# Patient Record
(demographics unavailable — no encounter records)

---

## 2025-04-01 NOTE — HISTORY OF PRESENT ILLNESS
[FreeTextEntry1] : 56-year-old F pt, with Hx of hypothyroidism (dx more than 10 years ago) and obesity (began ~21 years ago), self-referred, presents today to establish endocrine care. Other PMHx: HTH, arthritis, sciatica, COVID (2022) PSHx: hernia surgery, , removal of gangling cyst on L wrist FHx: Mother: HTN  SHx: nonsmoker. EtOH use usually. Working and lives with family.  Steroid Use: Y for rash on neck  No radiation or surgical ablation of neck. No hx of antidepressant medications.  No recent hospitalizations.  NKDA   10/11/2023 Pt is here for hypothyroidism (dx more than 10 years ago) and obesity management (noted ~20 years ago) after a routine visit to her PCP who referred her to an endocrinologist.   Pt has /85 and BMI 33.84.  CC: "I am feeling better today".  At her last PCP apt, she states she informed her PCP that she is "too fat" and wants to lose weight. Subsequently, she reports her PCP told her to see an endocrinologist. She states she was able to lose weight in the past by fasting but has been unable to keep the weight off. Moreover, she states she was prescribed a "2 pill concoction" in  by her PCP for weight loss which worked but she discontinued this medication after a few months for unknown reasons. She reports has fluctuated between 160-180 lbs over the past 20 years after the birth of her youngest child. She attributes this fluctuation to "not taking her thyroid medication correctly".  In regard to her hypothyroidism, she endorses no complaints.  Pt endorses pain in her back and knees due to arthritis and weight gain.   2023  Pt has /75 and BMI 33.11. No significant weight change.  Pt presents today for endocrine care and is clinically unchanged. Pt is not taking her metformin as originally prescribed because she feels "not right" when she takes twice a day. As a result, she takes it irregularly once a day. Pt has visited a RD and is attempting to improve her diet and modify her lifestyle. Pt denies palpitations and neck discomfort.  Review In Labs: - 23: TSH 3.5, Free T4 0.9  2025 Pt has /78 and BMI 29.6. No significant weight change. CC: "I am doing fabulous, even though I am sick today".  Pt w/ hx of obesity was consistently taking Zepbound since 2024. While taking Zepbound pt denies nausea, vomiting, diarrhea and endorses momentary dizziness. Starting Saturday, she endorses having had experienced diarrhea and states that she feels as though she has food poisoning. Pt states that she last used Zepbound on 2025 and has not used it since due to a medication shortage at her pharmacy.    [Medications verified as per pt on 2025] Current Medications: Mounjaro 2.5 mg qw (rx 2023), Levothyroxine 50 mcg qd, Rosuvastatin 10 mg qd, Amlodipine 5 mg qam, HCTZ 12.5 mg qd (does not take every day but takes regularly), Gabapentin 300 mg prn, Naproxen 500 mg bid

## 2025-04-01 NOTE — REVIEW OF SYSTEMS
[Negative] : Heme/Lymph [Dizziness] : dizziness [Nausea] : no nausea [Vomiting] : no vomiting [Diarrhea] : no diarrhea [de-identified] : brief dizziness.

## 2025-04-01 NOTE — ADDENDUM
[FreeTextEntry1] :  I, Sisi Naidu, act solely as a scribe for Dr. Theron Norris on this date. 04/01/2025

## 2025-04-01 NOTE — PHYSICAL EXAM
[No Acute Distress] : no acute distress [Normal Sclera/Conjunctiva] : normal sclera/conjunctiva [No Proptosis] : no proptosis [Normal Outer Ear/Nose] : the ears and nose were normal in appearance [Thyroid Not Enlarged] : the thyroid was not enlarged [No Thyroid Nodules] : no palpable thyroid nodules [Clear to Auscultation] : lungs were clear to auscultation bilaterally [Normal Rate] : heart rate was normal [No Edema] : no peripheral edema [Soft] : abdomen soft [Spine Straight] : spine straight [No Stigmata of Cushings Syndrome] : no stigmata of Cushings Syndrome [Normal Gait] : normal gait [Normal Strength/Tone] : muscle strength and tone were normal [No Rash] : no rash [Normal Reflexes] : deep tendon reflexes were 2+ and symmetric [No Tremors] : no tremors [Oriented x3] : oriented to person, place, and time [de-identified] : no buffalo hump. no supraclavicular fat pad.  [de-identified] : mild acanthosis.

## 2025-04-01 NOTE — END OF VISIT
[FreeTextEntry3] :  All medical record entries made by the Scribe were at my, Dr. Theron Norris, direction and personally dictated by me on 04/01/2025. I have reviewed the chart and agree that the record accurately reflects my personal performance of the history, physical exam, and assessment and plan. I have also personally directed, reviewed, and agreed with the chart. [Time Spent: ___ minutes] : I have spent [unfilled] minutes of time on the encounter which excludes teaching and separately reported services.

## 2025-04-01 NOTE — PHYSICAL EXAM
[No Acute Distress] : no acute distress [Normal Sclera/Conjunctiva] : normal sclera/conjunctiva [No Proptosis] : no proptosis [Normal Outer Ear/Nose] : the ears and nose were normal in appearance [Thyroid Not Enlarged] : the thyroid was not enlarged [No Thyroid Nodules] : no palpable thyroid nodules [Clear to Auscultation] : lungs were clear to auscultation bilaterally [Normal Rate] : heart rate was normal [No Edema] : no peripheral edema [Soft] : abdomen soft [Spine Straight] : spine straight [No Stigmata of Cushings Syndrome] : no stigmata of Cushings Syndrome [Normal Gait] : normal gait [Normal Strength/Tone] : muscle strength and tone were normal [No Rash] : no rash [Normal Reflexes] : deep tendon reflexes were 2+ and symmetric [No Tremors] : no tremors [Oriented x3] : oriented to person, place, and time [de-identified] : no buffalo hump. no supraclavicular fat pad.  [de-identified] : mild acanthosis.

## 2025-04-01 NOTE — REVIEW OF SYSTEMS
[Negative] : Heme/Lymph [Dizziness] : dizziness [Nausea] : no nausea [Vomiting] : no vomiting [Diarrhea] : no diarrhea [de-identified] : brief dizziness.

## 2025-07-12 NOTE — PHYSICAL EXAM
[No Acute Distress] : no acute distress [Normal Sclera/Conjunctiva] : normal sclera/conjunctiva [No Proptosis] : no proptosis [Normal Outer Ear/Nose] : the ears and nose were normal in appearance [Thyroid Not Enlarged] : the thyroid was not enlarged [No Thyroid Nodules] : no palpable thyroid nodules [Clear to Auscultation] : lungs were clear to auscultation bilaterally [Normal Rate] : heart rate was normal [No Edema] : no peripheral edema [Soft] : abdomen soft [Spine Straight] : spine straight [No Stigmata of Cushings Syndrome] : no stigmata of Cushings Syndrome [Normal Gait] : normal gait [Normal Strength/Tone] : muscle strength and tone were normal [No Rash] : no rash [Normal Reflexes] : deep tendon reflexes were 2+ and symmetric [No Tremors] : no tremors [Oriented x3] : oriented to person, place, and time [de-identified] : thyroid normal [de-identified] : no buffalo hump. no supraclavicular fat pad.  [de-identified] : mild acanthosis.

## 2025-07-12 NOTE — END OF VISIT
[FreeTextEntry3] :  All medical record entries made by the Scribe were at my, Dr. Theron Norris, direction and personally dictated by me on 07/10/2025. I have reviewed the chart and agree that the record accurately reflects my personal performance of the history, physical exam, and assessment and plan. I have also personally directed, reviewed, and agreed with the chart.  [Time Spent: ___ minutes] : I have spent [unfilled] minutes of time on the encounter which excludes teaching and separately reported services.

## 2025-07-12 NOTE — HISTORY OF PRESENT ILLNESS
[FreeTextEntry1] : 58-year-old F pt, with Hx of hypothyroidism (dx more than 10 years ago) and obesity (began ~21 years ago), self-referred, presents today to establish endocrine care. Other PMHx: HTH, arthritis, sciatica, COVID (2022) PSHx: hernia surgery, , removal of gangling cyst on L wrist FHx: Mother: HTN  SHx: nonsmoker. EtOH use usually. Working and lives with family.  Steroid Use: Y for rash on neck  No radiation or surgical ablation of neck. No hx of antidepressant medications.  No recent hospitalizations.  NKDA   10/11/2023 Pt is here for hypothyroidism (dx more than 10 years ago) and obesity management (noted ~20 years ago) after a routine visit to her PCP who referred her to an endocrinologist.   Pt has /85 and BMI 33.84.  CC: "I am feeling better today".  At her last PCP apt, she states she informed her PCP that she is "too fat" and wants to lose weight. Subsequently, she reports her PCP told her to see an endocrinologist. She states she was able to lose weight in the past by fasting but has been unable to keep the weight off. Moreover, she states she was prescribed a "2 pill concoction" in  by her PCP for weight loss which worked but she discontinued this medication after a few months for unknown reasons. She reports has fluctuated between 160-180 lbs over the past 20 years after the birth of her youngest child. She attributes this fluctuation to "not taking her thyroid medication correctly".  In regard to her hypothyroidism, she endorses no complaints.  Pt endorses pain in her back and knees due to arthritis and weight gain.   2023  Pt has /75 and BMI 33.11. No significant weight change.  Pt presents today for endocrine care and is clinically unchanged. Pt is not taking her metformin as originally prescribed because she feels "not right" when she takes twice a day. As a result, she takes it irregularly once a day. Pt has visited a RD and is attempting to improve her diet and modify her lifestyle. Pt denies palpitations and neck discomfort.  Review In Labs: - 23: TSH 3.5, Free T4 0.9  2025 Pt has /78 and BMI 29.6. No significant weight change. CC: "I am doing fabulous, even though I am sick today".  Pt w/ hx of obesity was consistently taking Zepbound since 2024. While taking Zepbound pt denies nausea, vomiting, diarrhea and endorses momentary dizziness. Starting Saturday, she endorses having had experienced diarrhea and states that she feels as though she has food poisoning. Pt states that she last used Zepbound on 2025 and has not used it since due to a medication shortage at her pharmacy.   07/10/2025 Pt has /73 and BMI 30.4. No significant weight change. CC: "My weight is stuck in the 160s".  Pt endorses daily body aches and joint pain that started a few years ago. She states that the last time she used Zebpound was in 2025, and she has not used it since because her pharmacy ran out of Zepbound. Pt mentioned today that her PCP left her clinic and she would like for us to manage her care.  [Medications verified as per pt on 07/10/2025] Current Medications: Zepbound 5 mg qw, Levothyroxine 50 mcg qd, Rosuvastatin 10 mg qd, Amlodipine 5 mg qam, HCTZ 12.5 mg qd (does not take every day but takes regularly), Naproxen 500 mg bid Held: Mounjaro 2.5 mg qw (rx 2023), Gabapentin 300 mg prn

## 2025-07-12 NOTE — PHYSICAL EXAM
[No Acute Distress] : no acute distress [Normal Sclera/Conjunctiva] : normal sclera/conjunctiva [No Proptosis] : no proptosis [Normal Outer Ear/Nose] : the ears and nose were normal in appearance [Thyroid Not Enlarged] : the thyroid was not enlarged [No Thyroid Nodules] : no palpable thyroid nodules [Clear to Auscultation] : lungs were clear to auscultation bilaterally [Normal Rate] : heart rate was normal [No Edema] : no peripheral edema [Soft] : abdomen soft [Spine Straight] : spine straight [No Stigmata of Cushings Syndrome] : no stigmata of Cushings Syndrome [Normal Gait] : normal gait [Normal Strength/Tone] : muscle strength and tone were normal [No Rash] : no rash [Normal Reflexes] : deep tendon reflexes were 2+ and symmetric [No Tremors] : no tremors [Oriented x3] : oriented to person, place, and time [de-identified] : thyroid normal [de-identified] : no buffalo hump. no supraclavicular fat pad.  [de-identified] : mild acanthosis.

## 2025-07-12 NOTE — ADDENDUM
[FreeTextEntry1] :  I, Sisi Naidu, act solely as a scribe for Dr. Theron Norris on this date. 07/10/2025